# Patient Record
Sex: MALE | NOT HISPANIC OR LATINO | URBAN - METROPOLITAN AREA
[De-identification: names, ages, dates, MRNs, and addresses within clinical notes are randomized per-mention and may not be internally consistent; named-entity substitution may affect disease eponyms.]

---

## 2024-02-01 ENCOUNTER — OFFICE VISIT (OUTPATIENT)
Dept: CARDIOLOGY | Facility: CLINIC | Age: 54
End: 2024-02-01
Payer: COMMERCIAL

## 2024-02-01 VITALS
BODY MASS INDEX: 31.16 KG/M2 | TEMPERATURE: 97.8 F | HEIGHT: 67 IN | WEIGHT: 198.5 LBS | DIASTOLIC BLOOD PRESSURE: 87 MMHG | HEART RATE: 91 BPM | SYSTOLIC BLOOD PRESSURE: 136 MMHG

## 2024-02-01 DIAGNOSIS — I10 ESSENTIAL HYPERTENSION: Primary | ICD-10-CM

## 2024-02-01 DIAGNOSIS — I25.10 CORONARY ARTERY DISEASE INVOLVING NATIVE CORONARY ARTERY OF NATIVE HEART WITHOUT ANGINA PECTORIS: ICD-10-CM

## 2024-02-01 DIAGNOSIS — E78.00 HYPERCHOLESTEREMIA: ICD-10-CM

## 2024-02-01 PROBLEM — Z95.5 STATUS POST INSERTION OF DRUG-ELUTING STENT INTO LEFT ANTERIOR DESCENDING (LAD) ARTERY: Status: ACTIVE | Noted: 2024-02-01

## 2024-02-01 PROCEDURE — 99214 OFFICE O/P EST MOD 30 MIN: CPT | Performed by: INTERNAL MEDICINE

## 2024-02-01 PROCEDURE — 3075F SYST BP GE 130 - 139MM HG: CPT | Performed by: INTERNAL MEDICINE

## 2024-02-01 PROCEDURE — 3079F DIAST BP 80-89 MM HG: CPT | Performed by: INTERNAL MEDICINE

## 2024-02-01 PROCEDURE — 1036F TOBACCO NON-USER: CPT | Performed by: INTERNAL MEDICINE

## 2024-02-01 RX ORDER — TAMSULOSIN HYDROCHLORIDE 0.4 MG/1
0.4 CAPSULE ORAL
COMMUNITY
Start: 2024-01-12

## 2024-02-01 RX ORDER — CLINDAMYCIN PHOSPHATE 11.9 MG/ML
SOLUTION TOPICAL
COMMUNITY
Start: 2023-11-29

## 2024-02-01 RX ORDER — EMPAGLIFLOZIN 10 MG/1
TABLET, FILM COATED ORAL
COMMUNITY
Start: 2023-02-02

## 2024-02-01 RX ORDER — ATORVASTATIN CALCIUM 80 MG/1
TABLET, FILM COATED ORAL
COMMUNITY
Start: 2020-03-03

## 2024-02-01 RX ORDER — OLMESARTAN MEDOXOMIL AND HYDROCHLOROTHIAZIDE 20/12.5 20; 12.5 MG/1; MG/1
TABLET ORAL
COMMUNITY
Start: 2024-01-25

## 2024-02-01 RX ORDER — ASPIRIN 81 MG/1
1 TABLET ORAL DAILY
COMMUNITY
Start: 2019-01-23

## 2024-02-01 RX ORDER — SITAGLIPTIN 100 MG/1
TABLET, FILM COATED ORAL
COMMUNITY
Start: 2024-01-25

## 2024-02-01 RX ORDER — METFORMIN HYDROCHLORIDE 1000 MG/1
TABLET ORAL EVERY 12 HOURS
COMMUNITY

## 2024-02-01 RX ORDER — TADALAFIL 20 MG/1
20 TABLET ORAL
COMMUNITY
Start: 2024-01-12

## 2024-02-01 NOTE — PROGRESS NOTES
Chief Complaint:   Coronary Artery Disease     History of Present Illness     Medardo Gomes is a 53 y.o. male presenting with for follow-up of coronary artery disease.  Medardo initially presented with an ACS and underwent emergent HERIBERTO LAD 2019.  The patient is tolerating guideline-directed medical therapy with antiplatelet and statin medication and is compliant.    The patient has been well since their last office appointment and is not having any anginal symptoms or dyspnea on exertion.  Admitted to hospital with side effects from Ozempic and had normal nuclear stress test.    Review of Systems  All pertinent systems have been reviewed and are negative except for what is stated in the history of present illness.    All other systems have been reviewed and are negative and noncontributory to this patient's current ailments.  .       Previous History     Past Medical History:  He has a past medical history of Atherosclerotic heart disease of native coronary artery without angina pectoris (05/14/2020), Coronary artery disease involving native coronary artery of native heart without angina pectoris (02/01/2024), Essential (primary) hypertension, Essential hypertension (02/01/2024), Hypercholesteremia (02/01/2024), Mixed hyperlipidemia, and Status post insertion of drug-eluting stent into left anterior descending (LAD) artery (02/01/2024).    Past Surgical History:  He has no past surgical history on file.      Social History:  He reports that he quit smoking about 31 years ago. His smoking use included cigarettes. He started smoking about 36 years ago. He has never used smokeless tobacco. He reports current alcohol use. He reports that he does not use drugs.    Family History:  No family history on file.     Allergies:  Ozempic [semaglutide]    Outpatient Medications:  Current Outpatient Medications   Medication Instructions    aspirin (Radha Low Dose Aspirin) 81 mg EC tablet 1 tablet, oral, Daily    atorvastatin  "(Lipitor) 80 mg tablet oral    clindamycin (Cleocin T) 1 % external solution APPLY TOPICALLY TO SCALP TWICE A DAY    Januvia 100 mg tablet     Jardiance 10 mg oral    metFORMIN (Glucophage) 1,000 mg tablet Every 12 hours    olmesartan-hydrochlorothiazide (BENIcar HCT) 20-12.5 mg tablet     tadalafil (CIALIS) 20 mg, oral    tamsulosin (FLOMAX) 0.4 mg, oral       Physical Examination   Vitals:  Visit Vitals  /87   Pulse 91   Temp 36.6 °C (97.8 °F)   Ht 1.702 m (5' 7\")   Wt 90 kg (198 lb 8 oz)   BMI 31.09 kg/m²   Smoking Status Former   BSA 2.06 m²      Physical Exam  Vitals reviewed.   Constitutional:       General: He is not in acute distress.     Appearance: Normal appearance.   HENT:      Head: Normocephalic and atraumatic.      Nose: Nose normal.   Eyes:      Conjunctiva/sclera: Conjunctivae normal.   Cardiovascular:      Rate and Rhythm: Normal rate and regular rhythm.      Pulses: Normal pulses.      Heart sounds: No murmur heard.  Pulmonary:      Effort: Pulmonary effort is normal. No respiratory distress.      Breath sounds: Normal breath sounds. No wheezing, rhonchi or rales.   Abdominal:      General: Bowel sounds are normal. There is no distension.      Palpations: Abdomen is soft.      Tenderness: There is no abdominal tenderness.   Musculoskeletal:         General: No swelling.      Right lower leg: No edema.      Left lower leg: No edema.   Skin:     General: Skin is warm and dry.      Capillary Refill: Capillary refill takes less than 2 seconds.   Neurological:      General: No focal deficit present.      Mental Status: He is alert.   Psychiatric:         Mood and Affect: Mood normal.            Labs/Imaging/Cardiac Studies     Last Labs:  CBC -  No results found for: \"WBC\", \"HGB\", \"HCT\", \"MCV\", \"PLT\"    CMP -  Lab Results   Component Value Date    PROT 6.3 (L) 02/02/2021    ALBUMIN 4.4 02/02/2021    AST 20 02/02/2021    ALT 42 02/02/2021    ALKPHOS 23 (L) 02/02/2021    BILITOT 0.8 02/02/2021 " "      LIPID PANEL -   Lab Results   Component Value Date    CHOL 131 02/02/2021    HDL 33.1 (A) 02/02/2021    CHHDL 4.0 02/02/2021    VLDL 29 02/02/2021    TRIG 145 02/02/2021       RENAL FUNCTION PANEL -   No results found for: \"GLU\", \"K\", \"CHLOR\", \"PHOS\"    Lab Results   Component Value Date    HGBA1C 7.3 (H) 11/10/2023       ECG:    Echo:  No echocardiogram results found for the past 12 months       Assessment and Recommendations     Assessment/Plan   1. Essential hypertension  Hypertension: The patient's blood pressure has been well-controlled at today's appointment or by recent primary care provider's measurements/home measurements and meets their goal blood pressure per the ACC/AHA guidelines.  The patient has been compliant with their anti-hypertensive medications and is following a low sodium/DASH diet.     2. Hypercholesteremia  LDL 40's in 8/2023 on statin - tolerating.    3. Coronary artery disease involving native coronary artery of native heart without angina pectoris  CAD: The patient's CAD, as detailed in the HPI, has been clinically stable, without any anginal symptoms or dyspnea.  The patient will continue treatment with guideline-directed medical therapy with antiplatelet and statin medications and will start regular exercise and a heart healthy diet.             Yoan Yang MD    Exclusive of any other services or procedures performed, I, Yoan Ynag MD , spent 30 minutes in duration for this visit today.  This time consisted of chart review, obtaining history, and/or performing the exam as documented above as well as documenting the clinical information for the encounter in the electronic record, discussing treatment options, plans, and/or goals with patient, family, and/or caregiver, refilling medications, updating the electronic record, ordering medicines, lab work, imaging, referrals, and/or procedures as documented above and communicating with other [facility] professionals. I have " discussed the results of laboratory, radiology, and cardiology studies with the patient and their family/caregiver.

## 2025-01-13 ENCOUNTER — TELEPHONE (OUTPATIENT)
Dept: CARDIOLOGY | Facility: CLINIC | Age: 55
End: 2025-01-13
Payer: COMMERCIAL

## 2025-01-13 NOTE — TELEPHONE ENCOUNTER
Called and spoke to patient. He realized that he called the wrong provider for refill of his tamsulosin. Pt will call prescribing provider.

## 2025-02-03 ENCOUNTER — OFFICE VISIT (OUTPATIENT)
Dept: CARDIOLOGY | Facility: CLINIC | Age: 55
End: 2025-02-03
Payer: COMMERCIAL

## 2025-02-03 VITALS
SYSTOLIC BLOOD PRESSURE: 135 MMHG | TEMPERATURE: 97.3 F | BODY MASS INDEX: 31.23 KG/M2 | HEART RATE: 75 BPM | HEIGHT: 67 IN | DIASTOLIC BLOOD PRESSURE: 83 MMHG | WEIGHT: 199 LBS

## 2025-02-03 DIAGNOSIS — Z95.5 STATUS POST INSERTION OF DRUG-ELUTING STENT INTO LEFT ANTERIOR DESCENDING (LAD) ARTERY: ICD-10-CM

## 2025-02-03 DIAGNOSIS — E78.00 HYPERCHOLESTEREMIA: ICD-10-CM

## 2025-02-03 DIAGNOSIS — I25.10 CORONARY ARTERY DISEASE INVOLVING NATIVE CORONARY ARTERY OF NATIVE HEART WITHOUT ANGINA PECTORIS: Primary | ICD-10-CM

## 2025-02-03 DIAGNOSIS — I10 ESSENTIAL HYPERTENSION: ICD-10-CM

## 2025-02-03 PROCEDURE — 3075F SYST BP GE 130 - 139MM HG: CPT | Performed by: INTERNAL MEDICINE

## 2025-02-03 PROCEDURE — 3079F DIAST BP 80-89 MM HG: CPT | Performed by: INTERNAL MEDICINE

## 2025-02-03 PROCEDURE — 1036F TOBACCO NON-USER: CPT | Performed by: INTERNAL MEDICINE

## 2025-02-03 PROCEDURE — 99214 OFFICE O/P EST MOD 30 MIN: CPT | Performed by: INTERNAL MEDICINE

## 2025-02-03 PROCEDURE — 93005 ELECTROCARDIOGRAM TRACING: CPT | Performed by: INTERNAL MEDICINE

## 2025-02-03 PROCEDURE — 3008F BODY MASS INDEX DOCD: CPT | Performed by: INTERNAL MEDICINE

## 2025-02-03 PROCEDURE — 99214 OFFICE O/P EST MOD 30 MIN: CPT | Mod: 25 | Performed by: INTERNAL MEDICINE

## 2025-02-03 RX ORDER — EZETIMIBE 10 MG/1
10 TABLET ORAL DAILY
Qty: 90 TABLET | Refills: 3 | Status: SHIPPED | OUTPATIENT
Start: 2025-02-03 | End: 2026-02-03

## 2025-02-03 RX ORDER — CLOBETASOL PROPIONATE 0.5 MG/ML
SOLUTION TOPICAL
COMMUNITY
Start: 2024-11-18

## 2025-02-03 NOTE — PROGRESS NOTES
Chief Complaint:   Coronary Artery Disease     History of Present Illness     Medardo Gomes is a 54 y.o. male presenting with coronary artery disease.  Medardo initially presented with ACS s/p PCI (HERIBERTO) LAD 1/25/19.   The patient is tolerating guideline-directed medical therapy with antiplatelet and statin medication and is compliant.  The patient exercises rarely and follows a heart healthy diet.  The patient has been well since their last office appointment.  He does complain of intermittent COOMBS and substernal non-radiating heaviness x 2 mins for the last 3-4 mos. He believes it is due to stress.    Review of symptoms  All pertinent systems have been reviewed and are negative except for what is stated in the history of present illness.    All other systems have been reviewed and are negative and noncontributory to this patient's current ailments.  .       Previous History     Past Medical History:  He has a past medical history of Atherosclerotic heart disease of native coronary artery without angina pectoris (05/14/2020), Coronary artery disease involving native coronary artery of native heart without angina pectoris (02/01/2024), Essential (primary) hypertension, Essential hypertension (02/01/2024), Hypercholesteremia (02/01/2024), Mixed hyperlipidemia, and Status post insertion of drug-eluting stent into left anterior descending (LAD) artery (02/01/2024).    Past Surgical History:  He has no past surgical history on file.      Social History:  He reports that he quit smoking about 32 years ago. His smoking use included cigarettes. He started smoking about 37 years ago. He has never used smokeless tobacco. He reports current alcohol use. He reports that he does not use drugs.    Family History:  No family history on file.     Allergies:  Ozempic [semaglutide]    Outpatient Medications:  Current Outpatient Medications   Medication Instructions    aspirin (Radha Low Dose Aspirin) 81 mg EC tablet 1 tablet, Daily  "   atorvastatin (Lipitor) 80 mg tablet Take by mouth.    clindamycin (Cleocin T) 1 % external solution APPLY TOPICALLY TO SCALP TWICE A DAY    clobetasol (Temovate) 0.05 % external solution APPLY TO SCALP TWICE A DAY. MIX WITH CLINDAMYCIN.    Januvia 100 mg tablet     Jardiance 10 mg Take by mouth.    metFORMIN (Glucophage) 1,000 mg tablet Every 12 hours    olmesartan-hydrochlorothiazide (BENIcar HCT) 20-12.5 mg tablet     tadalafil (CIALIS) 20 mg    tamsulosin (FLOMAX) 0.4 mg       Physical Examination   Vitals:  Visit Vitals  /83   Pulse 75   Temp 36.3 °C (97.3 °F)   Ht 1.702 m (5' 7\")   Wt 90.3 kg (199 lb)   BMI 31.17 kg/m²   Smoking Status Former   BSA 2.07 m²    Physical Exam  Vitals reviewed.   Constitutional:       General: He is not in acute distress.     Appearance: Normal appearance.   HENT:      Head: Normocephalic and atraumatic.      Nose: Nose normal.   Eyes:      Conjunctiva/sclera: Conjunctivae normal.   Cardiovascular:      Rate and Rhythm: Normal rate and regular rhythm.      Pulses: Normal pulses.      Heart sounds: No murmur heard.  Pulmonary:      Effort: Pulmonary effort is normal. No respiratory distress.      Breath sounds: Normal breath sounds. No wheezing, rhonchi or rales.   Abdominal:      General: Bowel sounds are normal. There is no distension.      Palpations: Abdomen is soft.      Tenderness: There is no abdominal tenderness.   Musculoskeletal:         General: No swelling.      Right lower leg: No edema.      Left lower leg: No edema.   Skin:     General: Skin is warm and dry.      Capillary Refill: Capillary refill takes less than 2 seconds.   Neurological:      General: No focal deficit present.      Mental Status: He is alert.   Psychiatric:         Mood and Affect: Mood normal.             Labs/Imaging/Cardiac Studies   I have personally reviewed the patient's available lab work, primary care appointment notes, pertinent imaging studies, and cardiac studies and have " discussed them and my independent interpretation of those results with the patient and caregiver at this appointment.  All pertinent recent Emergency Department evaluations and Hospital admissions were also reviewed in detail with the patient and caregiver.    Reviewed ECG and Labs    Echo:  No echocardiogram results found for the past 12 months       Assessment and Recommendations     Assessment/Plan       1. Coronary artery disease involving native coronary artery of native heart without angina pectoris (Primary)  The patient's CAD, as detailed in the HPI, has been clinically stable, but with c/o COOMBS and chest discomfort.  The patient will continue treatment with guideline-directed medical therapy with ASA and statin medications and was advised regular exercise and a heart healthy diet.    Stress echo.    - ECG 12 lead (Clinic Performed)  - Follow Up In Cardiology    2. Essential hypertension  The patient's blood pressure has been well-controlled at today's appointment or by recent primary care provider's measurements/home measurements and meets their goal blood pressure per the ACC/AHA guidelines.  The patient has been compliant with their anti-hypertensive medications and is following a low sodium/DASH diet. I advised continuation of their present medical treatment and lifestyle modification.      - Follow Up In Cardiology    3. Hypercholesteremia  The patient's lipids are fairly well controlled on chronic statin therapy and they are not meeting their goal LDL cholesterol per the ACC/AHA guidelines of <70 - Add Zetia.    - Follow Up In Cardiology    4. Status post insertion of drug-eluting stent into left anterior descending (LAD) artery             Yoan Yang MD    Exclusive of any other services or procedures performed, I, Yoan Yang MD , spent 30 minutes in duration for this visit today.  This time consisted of chart review, obtaining history, and/or performing the exam as documented above as well as  documenting the clinical information for the encounter in the electronic record, discussing treatment options, plans, and/or goals with patient, family, and/or caregiver, refilling medications, updating the electronic record, ordering medicines, lab work, imaging, referrals, and/or procedures as documented above and communicating with other [facility] professionals. I have discussed the results of laboratory, radiology, and cardiology studies with the patient and their family/caregiver.

## 2025-02-06 LAB
ATRIAL RATE: 81 BPM
P AXIS: 43 DEGREES
P OFFSET: 200 MS
P ONSET: 146 MS
PR INTERVAL: 154 MS
Q ONSET: 223 MS
QRS COUNT: 14 BEATS
QRS DURATION: 92 MS
QT INTERVAL: 364 MS
QTC CALCULATION(BAZETT): 422 MS
QTC FREDERICIA: 402 MS
R AXIS: -32 DEGREES
T AXIS: 20 DEGREES
T OFFSET: 405 MS
VENTRICULAR RATE: 81 BPM

## 2025-02-20 ENCOUNTER — APPOINTMENT (OUTPATIENT)
Dept: CARDIOLOGY | Facility: CLINIC | Age: 55
End: 2025-02-20
Payer: COMMERCIAL

## 2025-03-18 ENCOUNTER — TELEPHONE (OUTPATIENT)
Dept: CARDIOLOGY | Facility: CLINIC | Age: 55
End: 2025-03-18
Payer: COMMERCIAL

## 2025-03-20 ENCOUNTER — APPOINTMENT (OUTPATIENT)
Dept: CARDIOLOGY | Facility: CLINIC | Age: 55
End: 2025-03-20
Payer: COMMERCIAL

## 2025-03-20 ENCOUNTER — HOSPITAL ENCOUNTER (OUTPATIENT)
Dept: CARDIOLOGY | Facility: CLINIC | Age: 55
Discharge: HOME | End: 2025-03-20
Payer: COMMERCIAL

## 2025-03-20 VITALS
WEIGHT: 199 LBS | HEIGHT: 67 IN | SYSTOLIC BLOOD PRESSURE: 130 MMHG | HEART RATE: 111 BPM | BODY MASS INDEX: 31.23 KG/M2 | DIASTOLIC BLOOD PRESSURE: 80 MMHG

## 2025-03-20 VITALS
HEART RATE: 106 BPM | SYSTOLIC BLOOD PRESSURE: 130 MMHG | DIASTOLIC BLOOD PRESSURE: 80 MMHG | HEIGHT: 67 IN | BODY MASS INDEX: 31.23 KG/M2 | WEIGHT: 199 LBS

## 2025-03-20 DIAGNOSIS — I10 ESSENTIAL HYPERTENSION: ICD-10-CM

## 2025-03-20 DIAGNOSIS — Z95.5 STATUS POST INSERTION OF DRUG-ELUTING STENT INTO LEFT ANTERIOR DESCENDING (LAD) ARTERY: ICD-10-CM

## 2025-03-20 DIAGNOSIS — E78.00 HYPERCHOLESTEREMIA: ICD-10-CM

## 2025-03-20 DIAGNOSIS — I25.10 CORONARY ARTERY DISEASE INVOLVING NATIVE CORONARY ARTERY OF NATIVE HEART WITHOUT ANGINA PECTORIS: ICD-10-CM

## 2025-03-20 DIAGNOSIS — Z95.5 STATUS POST INSERTION OF DRUG-ELUTING STENT INTO LEFT ANTERIOR DESCENDING (LAD) ARTERY: Primary | ICD-10-CM

## 2025-03-20 PROCEDURE — 3075F SYST BP GE 130 - 139MM HG: CPT | Performed by: INTERNAL MEDICINE

## 2025-03-20 PROCEDURE — 1036F TOBACCO NON-USER: CPT | Performed by: INTERNAL MEDICINE

## 2025-03-20 PROCEDURE — 3079F DIAST BP 80-89 MM HG: CPT | Performed by: INTERNAL MEDICINE

## 2025-03-20 PROCEDURE — 3008F BODY MASS INDEX DOCD: CPT | Performed by: INTERNAL MEDICINE

## 2025-03-20 PROCEDURE — 93016 CV STRESS TEST SUPVJ ONLY: CPT | Performed by: INTERNAL MEDICINE

## 2025-03-20 PROCEDURE — 99213 OFFICE O/P EST LOW 20 MIN: CPT | Mod: 25 | Performed by: INTERNAL MEDICINE

## 2025-03-20 PROCEDURE — 93350 STRESS TTE ONLY: CPT | Performed by: INTERNAL MEDICINE

## 2025-03-20 PROCEDURE — C8928 TTE W OR W/O FOL W/CON,STRES: HCPCS

## 2025-03-20 PROCEDURE — 2500000004 HC RX 250 GENERAL PHARMACY W/ HCPCS (ALT 636 FOR OP/ED): Performed by: INTERNAL MEDICINE

## 2025-03-20 PROCEDURE — 93018 CV STRESS TEST I&R ONLY: CPT | Performed by: INTERNAL MEDICINE

## 2025-03-20 PROCEDURE — 99213 OFFICE O/P EST LOW 20 MIN: CPT | Performed by: INTERNAL MEDICINE

## 2025-03-20 RX ADMIN — PERFLUTREN 4 ML OF DILUTION: 6.52 INJECTION, SUSPENSION INTRAVENOUS at 13:28

## 2025-03-20 NOTE — PROGRESS NOTES
Chief Complaint:   Chest Pain     History of Present Illness     Medardo Gomes is a 54 y.o. male presenting with coronary artery disease.  Medardo initially presented with ACS s/p PCI (HERIBERTO) LAD 1/25/19.   The patient is tolerating guideline-directed medical therapy with antiplatelet and statin medication and is compliant.  The patient exercises rarely and follows a heart healthy diet.  The patient has been well since their last office appointment.  He does complain of intermittent COOMBS and substernal non-radiating heaviness x 2 mins for the last 3-4 mos. He believes it is due to stress.  Since here 2/3/25, no CP or COOMBS.  Has had prostate infection after Bx and flu.    Review of symptoms  All pertinent systems have been reviewed and are negative except for what is stated in the history of present illness.    All other systems have been reviewed and are negative and noncontributory to this patient's current ailments.  .       Previous History     Past Medical History:  He has a past medical history of Atherosclerotic heart disease of native coronary artery without angina pectoris (05/14/2020), Coronary artery disease involving native coronary artery of native heart without angina pectoris (02/01/2024), Essential (primary) hypertension, Essential hypertension (02/01/2024), Hypercholesteremia (02/01/2024), Mixed hyperlipidemia, and Status post insertion of drug-eluting stent into left anterior descending (LAD) artery (02/01/2024).    Past Surgical History:  He has no past surgical history on file.      Social History:  He reports that he quit smoking about 32 years ago. His smoking use included cigarettes. He started smoking about 37 years ago. He has never used smokeless tobacco. He reports current alcohol use. He reports that he does not use drugs.    Family History:  No family history on file.     Allergies:  Ozempic [semaglutide]    Outpatient Medications:  Current Outpatient Medications   Medication Instructions  "   aspirin (Radha Low Dose Aspirin) 81 mg EC tablet 1 tablet, Daily    atorvastatin (Lipitor) 80 mg tablet Take by mouth.    clindamycin (Cleocin T) 1 % external solution APPLY TOPICALLY TO SCALP TWICE A DAY    clobetasol (Temovate) 0.05 % external solution APPLY TO SCALP TWICE A DAY. MIX WITH CLINDAMYCIN.    ezetimibe (ZETIA) 10 mg, oral, Daily    Januvia 100 mg tablet     Jardiance 10 mg Take by mouth.    metFORMIN (Glucophage) 1,000 mg tablet Every 12 hours    olmesartan-hydrochlorothiazide (BENIcar HCT) 20-12.5 mg tablet     tadalafil 20 mg    tamsulosin (FLOMAX) 0.4 mg       Physical Examination   Vitals:  Visit Vitals  /80 (BP Location: Right arm)   Pulse (!) 111   Ht 1.702 m (5' 7\")   Wt 90.3 kg (199 lb)   BMI 31.17 kg/m²   Smoking Status Former   BSA 2.07 m²    Physical Exam  Vitals reviewed.   Constitutional:       General: He is not in acute distress.     Appearance: Normal appearance.   HENT:      Head: Normocephalic and atraumatic.      Nose: Nose normal.   Eyes:      Conjunctiva/sclera: Conjunctivae normal.   Cardiovascular:      Rate and Rhythm: Normal rate and regular rhythm.      Pulses: Normal pulses.      Heart sounds: No murmur heard.  Pulmonary:      Effort: Pulmonary effort is normal. No respiratory distress.      Breath sounds: Normal breath sounds. No wheezing, rhonchi or rales.   Abdominal:      General: Bowel sounds are normal. There is no distension.      Palpations: Abdomen is soft.      Tenderness: There is no abdominal tenderness.   Musculoskeletal:         General: No swelling.      Right lower leg: No edema.      Left lower leg: No edema.   Skin:     General: Skin is warm and dry.      Capillary Refill: Capillary refill takes less than 2 seconds.   Neurological:      General: No focal deficit present.      Mental Status: He is alert.   Psychiatric:         Mood and Affect: Mood normal.           Labs/Imaging/Cardiac Studies   I have personally reviewed the patient's available lab " work, primary care appointment notes, pertinent imaging studies, and cardiac studies and have discussed them and my independent interpretation of those results with the patient and caregiver at this appointment.  All pertinent recent Emergency Department evaluations and Hospital admissions were also reviewed in detail with the patient and caregiver.    Reviewed ECG and Labs    Reviewed stress Echo: EF 50% no ischemia.  No echocardiogram results found for the past 12 months       Assessment and Recommendations     Assessment/Plan       1. Coronary artery disease involving native coronary artery of native heart without angina pectoris (Primary)  The patient's CAD, as detailed in the HPI, has been clinically stable.  The patient will continue treatment with guideline-directed medical therapy with ASA and statin medications and was advised regular exercise and a heart healthy diet.    Normal Stress echo.    - ECG 12 lead (Clinic Performed)  - Follow Up In Cardiology    2. Essential hypertension  The patient's blood pressure has been well-controlled at today's appointment or by recent primary care provider's measurements/home measurements and meets their goal blood pressure per the ACC/AHA guidelines.  The patient has been compliant with their anti-hypertensive medications and is following a low sodium/DASH diet. I advised continuation of their present medical treatment and lifestyle modification.      - Follow Up In Cardiology    3. Hypercholesteremia  The patient's lipids are fairly well controlled on chronic statin therapy and they are not meeting their goal LDL cholesterol per the ACC/AHA guidelines of <70 - Added Zetia.  Check Lipids.    - Follow Up In Cardiology    4. Status post insertion of drug-eluting stent into left anterior descending (LAD) artery     Medardo Gomes will return in 1 year for an office visit.           Yoan Yang MD    Exclusive of any other services or procedures performed, I, Yoan GARCIA  MD Trey , spent 30 minutes in duration for this visit today.  This time consisted of chart review, obtaining history, and/or performing the exam as documented above as well as documenting the clinical information for the encounter in the electronic record, discussing treatment options, plans, and/or goals with patient, family, and/or caregiver, refilling medications, updating the electronic record, ordering medicines, lab work, imaging, referrals, and/or procedures as documented above and communicating with other [facility] professionals. I have discussed the results of laboratory, radiology, and cardiology studies with the patient and their family/caregiver.